# Patient Record
Sex: FEMALE | Race: WHITE | NOT HISPANIC OR LATINO
[De-identification: names, ages, dates, MRNs, and addresses within clinical notes are randomized per-mention and may not be internally consistent; named-entity substitution may affect disease eponyms.]

---

## 2017-01-30 PROBLEM — Z00.00 ENCOUNTER FOR PREVENTIVE HEALTH EXAMINATION: Status: ACTIVE | Noted: 2017-01-30

## 2017-01-31 ENCOUNTER — NON-APPOINTMENT (OUTPATIENT)
Age: 47
End: 2017-01-31

## 2017-01-31 ENCOUNTER — APPOINTMENT (OUTPATIENT)
Dept: CARDIOLOGY | Facility: CLINIC | Age: 47
End: 2017-01-31

## 2017-01-31 VITALS — SYSTOLIC BLOOD PRESSURE: 130 MMHG | DIASTOLIC BLOOD PRESSURE: 58 MMHG | HEART RATE: 58 BPM

## 2017-01-31 VITALS — DIASTOLIC BLOOD PRESSURE: 70 MMHG | SYSTOLIC BLOOD PRESSURE: 126 MMHG | HEART RATE: 60 BPM

## 2017-01-31 VITALS
TEMPERATURE: 98.2 F | HEIGHT: 69 IN | WEIGHT: 190 LBS | OXYGEN SATURATION: 98 % | DIASTOLIC BLOOD PRESSURE: 84 MMHG | HEART RATE: 58 BPM | SYSTOLIC BLOOD PRESSURE: 132 MMHG | BODY MASS INDEX: 28.14 KG/M2

## 2017-01-31 DIAGNOSIS — I35.1 NONRHEUMATIC AORTIC (VALVE) INSUFFICIENCY: ICD-10-CM

## 2017-01-31 DIAGNOSIS — R00.1 BRADYCARDIA, UNSPECIFIED: ICD-10-CM

## 2017-02-02 LAB
ALBUMIN SERPL ELPH-MCNC: 4.8 G/DL
ALP BLD-CCNC: 40 U/L
ALT SERPL-CCNC: 15 U/L
ANION GAP SERPL CALC-SCNC: 14 MMOL/L
AST SERPL-CCNC: 14 U/L
BASOPHILS # BLD AUTO: 0.01 K/UL
BASOPHILS NFR BLD AUTO: 0.2 %
BILIRUB SERPL-MCNC: 0.4 MG/DL
BUN SERPL-MCNC: 17 MG/DL
CALCIUM SERPL-MCNC: 9.8 MG/DL
CHLORIDE SERPL-SCNC: 101 MMOL/L
CHOLEST SERPL-MCNC: 250 MG/DL
CHOLEST/HDLC SERPL: 3.4 RATIO
CO2 SERPL-SCNC: 24 MMOL/L
CREAT SERPL-MCNC: 0.95 MG/DL
EOSINOPHIL # BLD AUTO: 0.1 K/UL
EOSINOPHIL NFR BLD AUTO: 1.9 %
GLUCOSE SERPL-MCNC: 83 MG/DL
HCT VFR BLD CALC: 40.6 %
HDLC SERPL-MCNC: 74 MG/DL
HGB BLD-MCNC: 13.5 G/DL
IMM GRANULOCYTES NFR BLD AUTO: 0.2 %
LDLC SERPL CALC-MCNC: 139 MG/DL
LYMPHOCYTES # BLD AUTO: 1.89 K/UL
LYMPHOCYTES NFR BLD AUTO: 35 %
MAN DIFF?: NORMAL
MCHC RBC-ENTMCNC: 33.3 GM/DL
MCHC RBC-ENTMCNC: 33.6 PG
MCV RBC AUTO: 101 FL
MONOCYTES # BLD AUTO: 0.57 K/UL
MONOCYTES NFR BLD AUTO: 10.6 %
NEUTROPHILS # BLD AUTO: 2.82 K/UL
NEUTROPHILS NFR BLD AUTO: 52.1 %
PLATELET # BLD AUTO: 212 K/UL
POTASSIUM SERPL-SCNC: 4.4 MMOL/L
PROT SERPL-MCNC: 7.6 G/DL
RBC # BLD: 4.02 M/UL
RBC # FLD: 12.9 %
SODIUM SERPL-SCNC: 139 MMOL/L
TRIGL SERPL-MCNC: 186 MG/DL
TSH SERPL-ACNC: 3.04 UIU/ML
WBC # FLD AUTO: 5.4 K/UL

## 2017-02-17 ENCOUNTER — APPOINTMENT (OUTPATIENT)
Dept: CARDIOLOGY | Facility: CLINIC | Age: 47
End: 2017-02-17

## 2017-02-17 ENCOUNTER — MED ADMIN CHARGE (OUTPATIENT)
Age: 47
End: 2017-02-17

## 2017-02-17 DIAGNOSIS — R06.02 SHORTNESS OF BREATH: ICD-10-CM

## 2017-08-02 ENCOUNTER — RESULT REVIEW (OUTPATIENT)
Age: 47
End: 2017-08-02

## 2018-12-26 ENCOUNTER — OUTPATIENT (OUTPATIENT)
Dept: OUTPATIENT SERVICES | Facility: HOSPITAL | Age: 48
LOS: 1 days | End: 2018-12-26
Payer: SELF-PAY

## 2018-12-26 VITALS
DIASTOLIC BLOOD PRESSURE: 70 MMHG | TEMPERATURE: 99 F | OXYGEN SATURATION: 97 % | HEART RATE: 53 BPM | WEIGHT: 190.04 LBS | SYSTOLIC BLOOD PRESSURE: 118 MMHG | HEIGHT: 68.5 IN

## 2018-12-26 VITALS — WEIGHT: 190.04 LBS | HEIGHT: 68.5 IN

## 2018-12-26 DIAGNOSIS — Z98.890 OTHER SPECIFIED POSTPROCEDURAL STATES: Chronic | ICD-10-CM

## 2018-12-26 DIAGNOSIS — Z41.1 ENCOUNTER FOR COSMETIC SURGERY: ICD-10-CM

## 2018-12-26 DIAGNOSIS — E65 LOCALIZED ADIPOSITY: ICD-10-CM

## 2018-12-26 DIAGNOSIS — Z01.818 ENCOUNTER FOR OTHER PREPROCEDURAL EXAMINATION: ICD-10-CM

## 2018-12-26 LAB
ALBUMIN SERPL ELPH-MCNC: 3.9 G/DL — SIGNIFICANT CHANGE UP (ref 3.3–5)
ALP SERPL-CCNC: 40 U/L — SIGNIFICANT CHANGE UP (ref 40–120)
ALT FLD-CCNC: 34 U/L DA — SIGNIFICANT CHANGE UP (ref 10–45)
ANION GAP SERPL CALC-SCNC: 7 MMOL/L — SIGNIFICANT CHANGE UP (ref 5–17)
AST SERPL-CCNC: 32 U/L — SIGNIFICANT CHANGE UP (ref 10–40)
BASOPHILS # BLD AUTO: 0.1 K/UL — SIGNIFICANT CHANGE UP (ref 0–0.2)
BASOPHILS NFR BLD AUTO: 1.2 % — SIGNIFICANT CHANGE UP (ref 0–2)
BILIRUB SERPL-MCNC: 0.2 MG/DL — SIGNIFICANT CHANGE UP (ref 0.2–1.2)
BUN SERPL-MCNC: 23 MG/DL — SIGNIFICANT CHANGE UP (ref 7–23)
CALCIUM SERPL-MCNC: 9.4 MG/DL — SIGNIFICANT CHANGE UP (ref 8.4–10.5)
CHLORIDE SERPL-SCNC: 104 MMOL/L — SIGNIFICANT CHANGE UP (ref 96–108)
CO2 SERPL-SCNC: 30 MMOL/L — SIGNIFICANT CHANGE UP (ref 22–31)
CREAT SERPL-MCNC: 0.95 MG/DL — SIGNIFICANT CHANGE UP (ref 0.5–1.3)
EOSINOPHIL # BLD AUTO: 0 K/UL — SIGNIFICANT CHANGE UP (ref 0–0.5)
EOSINOPHIL NFR BLD AUTO: 0.4 % — SIGNIFICANT CHANGE UP (ref 0–6)
GLUCOSE SERPL-MCNC: 87 MG/DL — SIGNIFICANT CHANGE UP (ref 70–99)
HCG SERPL QL: NEGATIVE — SIGNIFICANT CHANGE UP
HCT VFR BLD CALC: 37.2 % — SIGNIFICANT CHANGE UP (ref 34.5–45)
HGB BLD-MCNC: 12.8 G/DL — SIGNIFICANT CHANGE UP (ref 11.5–15.5)
LYMPHOCYTES # BLD AUTO: 2.8 K/UL — SIGNIFICANT CHANGE UP (ref 1–3.3)
LYMPHOCYTES # BLD AUTO: 42.5 % — SIGNIFICANT CHANGE UP (ref 13–44)
MCHC RBC-ENTMCNC: 34.2 PG — HIGH (ref 27–34)
MCHC RBC-ENTMCNC: 34.5 GM/DL — SIGNIFICANT CHANGE UP (ref 32–36)
MCV RBC AUTO: 99 FL — SIGNIFICANT CHANGE UP (ref 80–100)
MONOCYTES # BLD AUTO: 0.6 K/UL — SIGNIFICANT CHANGE UP (ref 0–0.9)
MONOCYTES NFR BLD AUTO: 9.6 % — SIGNIFICANT CHANGE UP (ref 2–14)
NEUTROPHILS # BLD AUTO: 3 K/UL — SIGNIFICANT CHANGE UP (ref 1.8–7.4)
NEUTROPHILS NFR BLD AUTO: 46.4 % — SIGNIFICANT CHANGE UP (ref 43–77)
PLATELET # BLD AUTO: 196 K/UL — SIGNIFICANT CHANGE UP (ref 150–400)
POTASSIUM SERPL-MCNC: 4.3 MMOL/L — SIGNIFICANT CHANGE UP (ref 3.5–5.3)
POTASSIUM SERPL-SCNC: 4.3 MMOL/L — SIGNIFICANT CHANGE UP (ref 3.5–5.3)
PROT SERPL-MCNC: 7.6 G/DL — SIGNIFICANT CHANGE UP (ref 6–8.3)
RBC # BLD: 3.75 M/UL — LOW (ref 3.8–5.2)
RBC # FLD: 11.4 % — SIGNIFICANT CHANGE UP (ref 10.3–14.5)
SODIUM SERPL-SCNC: 141 MMOL/L — SIGNIFICANT CHANGE UP (ref 135–145)
WBC # BLD: 6.6 K/UL — SIGNIFICANT CHANGE UP (ref 3.8–10.5)
WBC # FLD AUTO: 6.6 K/UL — SIGNIFICANT CHANGE UP (ref 3.8–10.5)

## 2018-12-26 PROCEDURE — 80053 COMPREHEN METABOLIC PANEL: CPT

## 2018-12-26 PROCEDURE — 93005 ELECTROCARDIOGRAM TRACING: CPT

## 2018-12-26 PROCEDURE — 85027 COMPLETE CBC AUTOMATED: CPT

## 2018-12-26 PROCEDURE — 93010 ELECTROCARDIOGRAM REPORT: CPT | Mod: NC

## 2018-12-26 PROCEDURE — 84703 CHORIONIC GONADOTROPIN ASSAY: CPT

## 2018-12-26 PROCEDURE — 36415 COLL VENOUS BLD VENIPUNCTURE: CPT

## 2018-12-26 PROCEDURE — G0463: CPT

## 2018-12-26 RX ORDER — SODIUM CHLORIDE 9 MG/ML
1000 INJECTION, SOLUTION INTRAVENOUS
Qty: 0 | Refills: 0 | Status: DISCONTINUED | OUTPATIENT
Start: 2018-12-28 | End: 2018-12-28

## 2018-12-26 NOTE — H&P PST ADULT - HISTORY OF PRESENT ILLNESS
47 y/o female with a PMH of depression on medication presents for presurgical testing and is currently without any complaints today . She is having abdominoplasty with liposuction of the arms and neck on 12/28/2018 .

## 2018-12-26 NOTE — H&P PST ADULT - NSANTHOSAYNRD_GEN_A_CORE
No. KAYLIN screening performed.  STOP BANG Legend: 0-2 = LOW Risk; 3-4 = INTERMEDIATE Risk; 5-8 = HIGH Risk

## 2018-12-26 NOTE — H&P PST ADULT - NSCAFFEINETYPE_GEN_ALL_CORE_SD
normal... Well appearing, well nourished, awake, alert, oriented to person, place, time/situation and in no apparent distress. coffee

## 2018-12-27 ENCOUNTER — APPOINTMENT (OUTPATIENT)
Dept: CARDIOLOGY | Facility: CLINIC | Age: 48
End: 2018-12-27
Payer: COMMERCIAL

## 2018-12-27 VITALS
BODY MASS INDEX: 28.44 KG/M2 | DIASTOLIC BLOOD PRESSURE: 78 MMHG | WEIGHT: 192 LBS | HEIGHT: 69 IN | OXYGEN SATURATION: 100 % | SYSTOLIC BLOOD PRESSURE: 120 MMHG | HEART RATE: 70 BPM

## 2018-12-27 DIAGNOSIS — E78.9 DISORDER OF LIPOPROTEIN METABOLISM, UNSPECIFIED: ICD-10-CM

## 2018-12-27 DIAGNOSIS — I35.0 NONRHEUMATIC AORTIC (VALVE) STENOSIS: ICD-10-CM

## 2018-12-27 DIAGNOSIS — Z01.818 ENCOUNTER FOR OTHER PREPROCEDURAL EXAMINATION: ICD-10-CM

## 2018-12-27 PROCEDURE — 99215 OFFICE O/P EST HI 40 MIN: CPT

## 2018-12-27 NOTE — DISCUSSION/SUMMARY
[Procedure Intermediate Risk] : the procedure risk is intermediate [Patient Low Risk] : the patient is a low surgical risk [Optimized for Surgery] : the patient is optimized for surgery [As per surgery] : as per surgery [Continue] : Continue medications as currently directed [FreeTextEntry1] : Patient is healthy and active, with no new symptoms. Stress test and echo less than 2 years ago, and unchanged exam, and EKG. She should not be considered to be at increased risk for the upcoming surgery and anesthesia

## 2018-12-27 NOTE — REVIEW OF SYSTEMS
[see HPI] : see HPI [Negative] : Heme/Lymph [Recent Weight Gain (___ Lbs)] : no recent weight gain [Feeling Fatigued] : not feeling fatigued [Recent Weight Loss (___ Lbs)] : no recent weight loss [Dyspnea on exertion] : not dyspnea during exertion [Chest  Pressure] : no chest pressure [Chest Pain] : no chest pain [Lower Ext Edema] : no extremity edema [Leg Claudication] : no intermittent leg claudication [Palpitations] : no palpitations [Dizziness] : no dizziness

## 2018-12-27 NOTE — REASON FOR VISIT
[FreeTextEntry1] : 46-year-old woman with a very positive family history for coronary artery disease and new dyspnea on exertion

## 2018-12-27 NOTE — HISTORY OF PRESENT ILLNESS
[Preoperative Visit] : for a medical evaluation prior to surgery [Scheduled Procedure ___] : a [unfilled] [Date of Surgery ___] : on [unfilled] [Surgeon Name ___] : surgeon: [unfilled] [Fever] : no fever [Chills] : no chills [Fatigue] : no fatigue [Chest Pain] : no chest pain [Cough] : no cough [Dyspnea] : no dyspnea [Dysuria] : no dysuria [Urinary Frequency] : no urinary frequency [Nausea] : no nausea [Vomiting] : no vomiting [Diarrhea] : no diarrhea [Abdominal Pain] : no abdominal pain [Easy Bruising] : no easy bruising [Lower Extremity Swelling] : no lower extremity swelling [Poor Exercise Tolerance] : no poor exercise tolerance [Diabetes] : no diabetes [Cardiovascular Disease] : no cardiovascular disease [Pulmonary Disease] : no pulmonary disease [Anti-Platelet Agents] : no anti-platelet agents [Nicotine Dependence] : no nicotine dependence [Alcohol Use] : no  alcohol use [Renal Disease] : no renal disease [GI Disease] : no gastrointestinal disease [Sleep Apnea] : no sleep apnea [Thromboembolic Problems] : no thromboembolic problems [Frequent use of NSAIDs] : no use of NSAIDs [Transfusion Reaction] : no transfusion reaction [Impaired Immunity] : no impaired immunity [Steroid Use in Last 6 Months] : no steroid use in the last six months [Frequent Aspirin Use] : no frequent aspirin use [Prior Anesthesia] : Prior anesthesia [Prev Anesthesia Reaction] : no previous anesthesia reaction [Electrocardiogram] : ~T an ECG ~C was performed [Echocardiogram] : ~T an echocardiogram ~C was performed [Cardiovascular Stress Test] : a cardiac stress test ~T ~C was performed [Metabolic Capacity ___Mets%] : The patient has a metabolic capacity of [unfilled] Mets%  [Good] : Good [Anesthesia Reaction] : no anesthesia reaction [Sudden Death] : sudden death [Clotting Disorder] : no clotting disorder [Bleeding Disorder] : no bleeding disorder [FreeTextEntry1] : 2017. 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 The patient has been active, working at 3-4 times a week, etc. 2 weeks ago she had shingles that was mostly around her left shoulder and back. She was recently on vacation in [place] and when walking uphill she felt short of breath and a "overwhelming sensation that she couldn't breathe". This would take a few minutes to resolve and this recurred a few times. Since then she has also noted that when she does her usual workouts, she is "more wiped than usual.". She tried to listen to herself with a stethoscope and thought she had a significant murmur and came for an appointment. She claims she has always had some issues going up stairs and with aerobic exercise, but this seems to be more than usual. She is premenopausal on birth control. After a couple of miscarriages she was diagnosed with a possible antiphospholipid antibody and had Lovenox for subsequent pregnancies without problems. She has a history of varicose veins and has been having some progressive edema over the years. Her last checkup was when she had a preop clearance for bunion surgery 2 years ago, and no murmur was mentioned. Unclear if or when she has had her cholesterol checked. She denies tick bite or exposure to such; her EKG did have sinus bradycardia with sinus arrhythmia in the low 50s. She has never had syncope, near syncope, or palpitations. No history of asthma. Never told of a heart murmur as a kid.\par 2017. Patient returned for stress test and echocardiogram. On the stress test she was able to exercise for 8 minutes to a heart rate of 155 and blood pressure 150/92. No symptoms. No ischemic changes. No arrhythmias. Her echocardiogram had thickened mitral leaflets and minimal MR. Mildly sclerotic, trileaflet aortic valve. A peak gradient of 16 and a mean of 11.. No AI. Normal LV size and function. Normal RV size and function. LVEF 60%. RVSP 26.\par 2018. Patient is here for preop evaluation for abdominoplasty with liposuction of the arms and neck. She has been totally stable over the past 2 years. She is on a mild dose of citalopram 20 mg for depression. No hospitalizations, emergency room visits, trauma, etc. No cardiac symptoms. Fairly active. She does have a fair amount of stress including her daughter, who has a rare movement disorder syndrome that is in the process of being evaluated

## 2018-12-27 NOTE — PHYSICAL EXAM
[General Appearance - Well Developed] : well developed [Normal Appearance] : normal appearance [Well Groomed] : well groomed [General Appearance - Well Nourished] : well nourished [No Deformities] : no deformities [General Appearance - In No Acute Distress] : no acute distress [Normal Conjunctiva] : the conjunctiva exhibited no abnormalities [Eyelids - No Xanthelasma] : the eyelids demonstrated no xanthelasmas [Normal Oral Mucosa] : normal oral mucosa [No Oral Pallor] : no oral pallor [No Oral Cyanosis] : no oral cyanosis [Normal Jugular Venous A Waves Present] : normal jugular venous A waves present [Normal Jugular Venous V Waves Present] : normal jugular venous V waves present [No Jugular Venous Lynch A Waves] : no jugular venous lynch A waves [Respiration, Rhythm And Depth] : normal respiratory rhythm and effort [Exaggerated Use Of Accessory Muscles For Inspiration] : no accessory muscle use [Auscultation Breath Sounds / Voice Sounds] : lungs were clear to auscultation bilaterally [Heart Rate And Rhythm] : heart rate and rhythm were normal [Heart Sounds] : normal S1 and S2 [Abdomen Soft] : soft [Abdomen Tenderness] : non-tender [Abdomen Mass (___ Cm)] : no abdominal mass palpated [Abnormal Walk] : normal gait [Gait - Sufficient For Exercise Testing] : the gait was sufficient for exercise testing [Nail Clubbing] : no clubbing of the fingernails [Cyanosis, Localized] : no localized cyanosis [Petechial Hemorrhages (___cm)] : no petechial hemorrhages [Skin Color & Pigmentation] : normal skin color and pigmentation [] : no rash [No Venous Stasis] : no venous stasis [Skin Lesions] : no skin lesions [No Skin Ulcers] : no skin ulcer [No Xanthoma] : no  xanthoma was observed [Oriented To Time, Place, And Person] : oriented to person, place, and time [Affect] : the affect was normal [Mood] : the mood was normal [FreeTextEntry1] : No significant edema. Pulses 2+ bilaterally symmetrical including pedal. [No Anxiety] : not feeling anxious

## 2018-12-28 ENCOUNTER — RESULT REVIEW (OUTPATIENT)
Age: 48
End: 2018-12-28

## 2018-12-28 ENCOUNTER — OUTPATIENT (OUTPATIENT)
Dept: OUTPATIENT SERVICES | Facility: HOSPITAL | Age: 48
LOS: 1 days | End: 2018-12-28
Payer: SELF-PAY

## 2018-12-28 VITALS
SYSTOLIC BLOOD PRESSURE: 111 MMHG | WEIGHT: 190.04 LBS | RESPIRATION RATE: 17 BRPM | HEART RATE: 60 BPM | TEMPERATURE: 98 F | HEIGHT: 68.5 IN | DIASTOLIC BLOOD PRESSURE: 71 MMHG | OXYGEN SATURATION: 99 %

## 2018-12-28 VITALS
HEART RATE: 59 BPM | DIASTOLIC BLOOD PRESSURE: 66 MMHG | RESPIRATION RATE: 16 BRPM | SYSTOLIC BLOOD PRESSURE: 101 MMHG | OXYGEN SATURATION: 99 % | TEMPERATURE: 98 F

## 2018-12-28 DIAGNOSIS — Z98.890 OTHER SPECIFIED POSTPROCEDURAL STATES: Chronic | ICD-10-CM

## 2018-12-28 DIAGNOSIS — Z41.1 ENCOUNTER FOR COSMETIC SURGERY: ICD-10-CM

## 2018-12-28 DIAGNOSIS — E65 LOCALIZED ADIPOSITY: ICD-10-CM

## 2018-12-28 PROCEDURE — 15847 EXC SKIN ABD ADD-ON: CPT

## 2018-12-28 PROCEDURE — 15830 EXC EXCESSIVE SKIN ABDOMEN: CPT

## 2018-12-28 PROCEDURE — 15876 SUCTION LIPECTOMY HEAD&NECK: CPT

## 2018-12-28 PROCEDURE — 15877 SUCTION LIPECTOMY TRUNK: CPT

## 2018-12-28 PROCEDURE — 15878 SUCTION LIPECTOMY UPR EXTREM: CPT | Mod: 50

## 2018-12-28 RX ORDER — ONDANSETRON 8 MG/1
4 TABLET, FILM COATED ORAL EVERY 6 HOURS
Qty: 0 | Refills: 0 | Status: DISCONTINUED | OUTPATIENT
Start: 2018-12-28 | End: 2019-01-12

## 2018-12-28 RX ORDER — ONDANSETRON 8 MG/1
4 TABLET, FILM COATED ORAL ONCE
Qty: 0 | Refills: 0 | Status: DISCONTINUED | OUTPATIENT
Start: 2018-12-28 | End: 2018-12-28

## 2018-12-28 RX ORDER — SODIUM CHLORIDE 9 MG/ML
1000 INJECTION, SOLUTION INTRAVENOUS
Qty: 0 | Refills: 0 | Status: DISCONTINUED | OUTPATIENT
Start: 2018-12-28 | End: 2018-12-28

## 2018-12-28 RX ORDER — HYDROMORPHONE HYDROCHLORIDE 2 MG/ML
0.5 INJECTION INTRAMUSCULAR; INTRAVENOUS; SUBCUTANEOUS ONCE
Qty: 0 | Refills: 0 | Status: DISCONTINUED | OUTPATIENT
Start: 2018-12-28 | End: 2018-12-28

## 2018-12-28 RX ORDER — ACETAMINOPHEN WITH CODEINE 300MG-30MG
1 TABLET ORAL EVERY 4 HOURS
Qty: 0 | Refills: 0 | Status: DISCONTINUED | OUTPATIENT
Start: 2018-12-28 | End: 2018-12-28

## 2018-12-28 NOTE — BRIEF OPERATIVE NOTE - PROCEDURE
<<-----Click on this checkbox to enter Procedure Liposuction of both thighs  12/28/2018    Active  [email or web address]  Liposuction of neck  12/28/2018    Active  [email or web address]  Abdominal liposuction  12/28/2018    Active  [email or web address] Liposuction of both upper arms  12/28/2018    Active  [email or web address]

## 2018-12-28 NOTE — BRIEF OPERATIVE NOTE - POST-OP DX
Localized adiposity of abdomen  12/28/2018    Active  Sally Gaston  Localized adiposity of neck  12/28/2018    Active  Sally Gaston  Localized adiposity of thigh  12/28/2018  bilateral  Active  Sally Gaston Localized adiposity  12/28/2018  bialteral upper arms  Active  Sally Gaston  Localized adiposity of abdomen  12/28/2018    Active  Sally Gaston  Localized adiposity of neck  12/28/2018    Active  Sally Gaston  Localized adiposity of thigh  12/28/2018  bilateral  Active  Sally Gaston

## 2018-12-28 NOTE — ASU DISCHARGE PLAN (ADULT/PEDIATRIC). - SPECIAL INSTRUCTIONS
Empty DAX drains as needed; return to self-suction.  Keep head of bed elevated with feet/knees elevated ("jackknife" position) to alleviate tension on abdomen.  Wear abdominal binder loosely for comfort.  Pain medication, anti-nausea medication and antibiotics as per Dr. Miramontes's instructions (given in office)

## 2018-12-28 NOTE — BRIEF OPERATIVE NOTE - PRE-OP DX
Localized adiposity of abdomen  12/28/2018    Sally Cadet  Localized adiposity of neck  12/28/2018    Sally Cadet  Localized adiposity of thigh  12/28/2018    Active  Sally Gaston Adiposity, localized  12/28/2018  bilateral upper arms  Active  Sally Gaston  Localized adiposity of abdomen  12/28/2018    Active  Sally Gaston  Localized adiposity of neck  12/28/2018    Active  Sally Gaston  Localized adiposity of thigh  12/28/2018    Active  Sally Gaston

## 2018-12-28 NOTE — ASU DISCHARGE PLAN (ADULT/PEDIATRIC). - FOLLOWUP APPOINTMENT CLINIC/PHYSICIAN
Dr. Miramontes will call you over the weekend; follow-up in his office in 1 week; call for appointment.

## 2018-12-28 NOTE — ASU DISCHARGE PLAN (ADULT/PEDIATRIC). - NOTIFY
Bleeding that does not stop/Unable to Urinate/Inability to Tolerate Liquids or Foods/Persistent Nausea and Vomiting/Fever greater than 101/Pain not relieved by Medications

## 2019-06-28 PROBLEM — F32.9 MAJOR DEPRESSIVE DISORDER, SINGLE EPISODE, UNSPECIFIED: Chronic | Status: ACTIVE | Noted: 2018-12-26

## 2019-07-01 ENCOUNTER — OUTPATIENT (OUTPATIENT)
Dept: OUTPATIENT SERVICES | Facility: HOSPITAL | Age: 49
LOS: 1 days | End: 2019-07-01
Payer: SELF-PAY

## 2019-07-01 VITALS
RESPIRATION RATE: 15 BRPM | WEIGHT: 197.53 LBS | HEIGHT: 68.5 IN | HEART RATE: 63 BPM | SYSTOLIC BLOOD PRESSURE: 113 MMHG | TEMPERATURE: 99 F | DIASTOLIC BLOOD PRESSURE: 79 MMHG

## 2019-07-01 DIAGNOSIS — Z01.818 ENCOUNTER FOR OTHER PREPROCEDURAL EXAMINATION: ICD-10-CM

## 2019-07-01 DIAGNOSIS — Z98.890 OTHER SPECIFIED POSTPROCEDURAL STATES: Chronic | ICD-10-CM

## 2019-07-01 DIAGNOSIS — N62 HYPERTROPHY OF BREAST: ICD-10-CM

## 2019-07-01 DIAGNOSIS — F32.9 MAJOR DEPRESSIVE DISORDER, SINGLE EPISODE, UNSPECIFIED: ICD-10-CM

## 2019-07-01 LAB
ANION GAP SERPL CALC-SCNC: 8 MMOL/L — SIGNIFICANT CHANGE UP (ref 5–17)
BUN SERPL-MCNC: 17 MG/DL — SIGNIFICANT CHANGE UP (ref 7–23)
CALCIUM SERPL-MCNC: 9.4 MG/DL — SIGNIFICANT CHANGE UP (ref 8.4–10.5)
CHLORIDE SERPL-SCNC: 103 MMOL/L — SIGNIFICANT CHANGE UP (ref 96–108)
CO2 SERPL-SCNC: 28 MMOL/L — SIGNIFICANT CHANGE UP (ref 22–31)
CREAT SERPL-MCNC: 0.78 MG/DL — SIGNIFICANT CHANGE UP (ref 0.5–1.3)
GLUCOSE SERPL-MCNC: 94 MG/DL — SIGNIFICANT CHANGE UP (ref 70–99)
HCG SERPL-ACNC: <2 MIU/ML — SIGNIFICANT CHANGE UP
HCT VFR BLD CALC: 36.3 % — SIGNIFICANT CHANGE UP (ref 34.5–45)
HGB BLD-MCNC: 12.3 G/DL — SIGNIFICANT CHANGE UP (ref 11.5–15.5)
MCHC RBC-ENTMCNC: 33.8 PG — SIGNIFICANT CHANGE UP (ref 27–34)
MCHC RBC-ENTMCNC: 33.9 GM/DL — SIGNIFICANT CHANGE UP (ref 32–36)
MCV RBC AUTO: 99.7 FL — SIGNIFICANT CHANGE UP (ref 80–100)
NRBC # BLD: 0 /100 WBCS — SIGNIFICANT CHANGE UP (ref 0–0)
PLATELET # BLD AUTO: 185 K/UL — SIGNIFICANT CHANGE UP (ref 150–400)
POTASSIUM SERPL-MCNC: 4.3 MMOL/L — SIGNIFICANT CHANGE UP (ref 3.5–5.3)
POTASSIUM SERPL-SCNC: 4.3 MMOL/L — SIGNIFICANT CHANGE UP (ref 3.5–5.3)
RBC # BLD: 3.64 M/UL — LOW (ref 3.8–5.2)
RBC # FLD: 11.9 % — SIGNIFICANT CHANGE UP (ref 10.3–14.5)
SODIUM SERPL-SCNC: 139 MMOL/L — SIGNIFICANT CHANGE UP (ref 135–145)
WBC # BLD: 5.81 K/UL — SIGNIFICANT CHANGE UP (ref 3.8–10.5)
WBC # FLD AUTO: 5.81 K/UL — SIGNIFICANT CHANGE UP (ref 3.8–10.5)

## 2019-07-01 PROCEDURE — 84702 CHORIONIC GONADOTROPIN TEST: CPT

## 2019-07-01 PROCEDURE — 36415 COLL VENOUS BLD VENIPUNCTURE: CPT

## 2019-07-01 PROCEDURE — 93010 ELECTROCARDIOGRAM REPORT: CPT | Mod: NC

## 2019-07-01 PROCEDURE — 93005 ELECTROCARDIOGRAM TRACING: CPT

## 2019-07-01 PROCEDURE — G0463: CPT

## 2019-07-01 PROCEDURE — 85027 COMPLETE CBC AUTOMATED: CPT

## 2019-07-01 PROCEDURE — 80048 BASIC METABOLIC PNL TOTAL CA: CPT

## 2019-07-01 NOTE — H&P PST ADULT - NSICDXPASTSURGICALHX_GEN_ALL_CORE_FT
PAST SURGICAL HISTORY:  H/O abdominoplasty     History of dilatation and curettage x 3    S/P bunionectomy

## 2019-07-01 NOTE — H&P PST ADULT - NSICDXPROBLEM_GEN_ALL_CORE_FT
PROBLEM DIAGNOSES  Problem: Hypertrophy of breast  Assessment and Plan: Bilateral Breast Reduction with Liposuction scheduled for 7/8/2019  Pre-op instructions given. Pt verbalized understanding  Pepcid given for GI prophylaxis  Chlorhexidine wash instructions given  Pending: Medical clearance - requested by surgeon     Problem: Depression  Assessment and Plan: Pt instructed to take meds as prescribed

## 2019-07-01 NOTE — H&P PST ADULT - NEGATIVE PSYCHIATRIC SYMPTOMS
no anxiety/no memory loss/no agitation/no suicidal ideation/no paranoia/no mood swings/no insomnia/no visual hallucinations

## 2019-07-01 NOTE — H&P PST ADULT - NSICDXFAMILYHX_GEN_ALL_CORE_FT
FAMILY HISTORY:  Father  Still living? Yes, Estimated age: Age Unknown  Family history of hypertension, Age at diagnosis: Age Unknown    Mother  Still living? Yes, Estimated age: Age Unknown  Family history of diabetes mellitus, Age at diagnosis: Age Unknown

## 2019-07-01 NOTE — H&P PST ADULT - HISTORY OF PRESENT ILLNESS
47yo female with medical h/o Depression, c/o large breast and presents today for presurgical testing for Bilateral Breast Reduction with Liposuction scheduled for 7/8/2019

## 2019-07-01 NOTE — H&P PST ADULT - MUSCULOSKELETAL
negative detailed exam no joint warmth/ROM intact/no joint swelling/no joint erythema/no calf tenderness/normal strength

## 2019-07-07 ENCOUNTER — TRANSCRIPTION ENCOUNTER (OUTPATIENT)
Age: 49
End: 2019-07-07

## 2019-07-08 ENCOUNTER — RESULT REVIEW (OUTPATIENT)
Age: 49
End: 2019-07-08

## 2019-07-08 ENCOUNTER — OUTPATIENT (OUTPATIENT)
Dept: OUTPATIENT SERVICES | Facility: HOSPITAL | Age: 49
LOS: 1 days | End: 2019-07-08
Payer: SELF-PAY

## 2019-07-08 VITALS
DIASTOLIC BLOOD PRESSURE: 58 MMHG | HEART RATE: 58 BPM | SYSTOLIC BLOOD PRESSURE: 100 MMHG | OXYGEN SATURATION: 100 % | RESPIRATION RATE: 16 BRPM | TEMPERATURE: 98 F

## 2019-07-08 VITALS
WEIGHT: 197.53 LBS | DIASTOLIC BLOOD PRESSURE: 79 MMHG | RESPIRATION RATE: 18 BRPM | TEMPERATURE: 99 F | OXYGEN SATURATION: 99 % | HEIGHT: 68.5 IN | SYSTOLIC BLOOD PRESSURE: 121 MMHG | HEART RATE: 68 BPM

## 2019-07-08 DIAGNOSIS — N62 HYPERTROPHY OF BREAST: ICD-10-CM

## 2019-07-08 DIAGNOSIS — Z98.890 OTHER SPECIFIED POSTPROCEDURAL STATES: Chronic | ICD-10-CM

## 2019-07-08 PROCEDURE — 88305 TISSUE EXAM BY PATHOLOGIST: CPT | Mod: 26

## 2019-07-08 PROCEDURE — 15879 SUCTION LIPECTOMY LWR EXTREM: CPT

## 2019-07-08 PROCEDURE — 88304 TISSUE EXAM BY PATHOLOGIST: CPT

## 2019-07-08 PROCEDURE — 88304 TISSUE EXAM BY PATHOLOGIST: CPT | Mod: 26

## 2019-07-08 PROCEDURE — 88305 TISSUE EXAM BY PATHOLOGIST: CPT

## 2019-07-08 PROCEDURE — 15877 SUCTION LIPECTOMY TRUNK: CPT

## 2019-07-08 PROCEDURE — 15878 SUCTION LIPECTOMY UPR EXTREM: CPT

## 2019-07-08 PROCEDURE — 19318 BREAST REDUCTION: CPT | Mod: 50

## 2019-07-08 RX ORDER — CITALOPRAM 10 MG/1
1 TABLET, FILM COATED ORAL
Qty: 0 | Refills: 0 | DISCHARGE

## 2019-07-08 RX ORDER — CEPHALEXIN 500 MG
1 CAPSULE ORAL
Qty: 21 | Refills: 0
Start: 2019-07-08 | End: 2019-07-14

## 2019-07-08 RX ORDER — ACETAMINOPHEN WITH CODEINE 300MG-30MG
1 TABLET ORAL
Qty: 20 | Refills: 0
Start: 2019-07-08

## 2019-07-08 RX ORDER — SODIUM CHLORIDE 9 MG/ML
1000 INJECTION, SOLUTION INTRAVENOUS
Refills: 0 | Status: DISCONTINUED | OUTPATIENT
Start: 2019-07-08 | End: 2019-07-08

## 2019-07-08 RX ORDER — OXYCODONE HYDROCHLORIDE 5 MG/1
5 TABLET ORAL ONCE
Refills: 0 | Status: DISCONTINUED | OUTPATIENT
Start: 2019-07-08 | End: 2019-07-08

## 2019-07-08 RX ORDER — HYDROMORPHONE HYDROCHLORIDE 2 MG/ML
0.5 INJECTION INTRAMUSCULAR; INTRAVENOUS; SUBCUTANEOUS ONCE
Refills: 0 | Status: DISCONTINUED | OUTPATIENT
Start: 2019-07-08 | End: 2019-07-08

## 2019-07-08 RX ORDER — ONDANSETRON 8 MG/1
4 TABLET, FILM COATED ORAL EVERY 6 HOURS
Refills: 0 | Status: DISCONTINUED | OUTPATIENT
Start: 2019-07-08 | End: 2019-07-23

## 2019-07-08 RX ORDER — ONDANSETRON 8 MG/1
1 TABLET, FILM COATED ORAL
Qty: 15 | Refills: 0
Start: 2019-07-08

## 2019-07-08 RX ORDER — ONDANSETRON 8 MG/1
4 TABLET, FILM COATED ORAL ONCE
Refills: 0 | Status: COMPLETED | OUTPATIENT
Start: 2019-07-08 | End: 2019-07-08

## 2019-07-08 RX ORDER — ACETAMINOPHEN 500 MG
650 TABLET ORAL EVERY 6 HOURS
Refills: 0 | Status: DISCONTINUED | OUTPATIENT
Start: 2019-07-08 | End: 2019-07-23

## 2019-07-08 RX ORDER — OXYCODONE HYDROCHLORIDE 5 MG/1
10 TABLET ORAL ONCE
Refills: 0 | Status: DISCONTINUED | OUTPATIENT
Start: 2019-07-08 | End: 2019-07-08

## 2019-07-08 RX ADMIN — ONDANSETRON 4 MILLIGRAM(S): 8 TABLET, FILM COATED ORAL at 17:04

## 2019-07-08 RX ADMIN — SODIUM CHLORIDE 50 MILLILITER(S): 9 INJECTION, SOLUTION INTRAVENOUS at 07:55

## 2019-07-08 NOTE — ASU DISCHARGE PLAN (ADULT/PEDIATRIC) - CALL YOUR DOCTOR IF YOU HAVE ANY OF THE FOLLOWING:
Bleeding that does not stop/Fever greater than (need to indicate Fahrenheit or Celsius)/Inability to tolerate liquids or foods/Wound/Surgical Site with redness, or foul smelling discharge or pus/Pain not relieved by Medications/Numbness, tingling, color or temperature change to extremity/Unable to urinate/Swelling that gets worse/Nausea and vomiting that does not stop

## 2019-07-08 NOTE — BRIEF OPERATIVE NOTE - NSICDXBRIEFPREOP_GEN_ALL_CORE_FT
PRE-OP DIAGNOSIS:  Adiposity 08-Jul-2019 15:35:11  Sally Gaston  Breast hypertrophy 08-Jul-2019 15:34:48  Sally Gaston

## 2019-07-08 NOTE — ASU DISCHARGE PLAN (ADULT/PEDIATRIC) - CARE PROVIDER_API CALL
Mk Miramontes)  Plastic Surgery  [address] [place] [id number]  Phone: [phone]  Fax: [phone]  Follow Up Time:

## 2019-07-08 NOTE — ASU DISCHARGE PLAN (ADULT/PEDIATRIC) - PROCEDURE
bilat breast reduction; liposuction of both upper arms, flanks, back and hips; scar revision left lateral abdomen

## 2019-07-08 NOTE — ASU DISCHARGE PLAN (ADULT/PEDIATRIC) - ASU DC SPECIAL INSTRUCTIONSFT
Wear surgical bra at all times until advised otherwise by Dr. Miramontes.  Do NOT remove clear surgical glue/webbing over breast incisions.  Dr. Miramontes will call you tonight; follow-up in his office as per his instructions.

## 2019-07-08 NOTE — BRIEF OPERATIVE NOTE - NSICDXBRIEFPROCEDURE_GEN_ALL_CORE_FT
PROCEDURES:  Liposuction, back 08-Jul-2019 15:34:04 bilateral flanks Sally Gaston  Liposuction of both upper extremities 08-Jul-2019 15:33:13  Sally Gaston  Liposuction, hip 08-Jul-2019 15:31:31  Sally Gaston  Reduction of both breasts 08-Jul-2019 15:31:16  Sally Gaston

## 2019-07-08 NOTE — BRIEF OPERATIVE NOTE - NSICDXBRIEFPOSTOP_GEN_ALL_CORE_FT
POST-OP DIAGNOSIS:  Adiposity 08-Jul-2019 15:35:46  Sally Gaston  Breast hypertrophy 08-Jul-2019 15:35:33  Sally Gaston

## 2019-07-10 LAB — SURGICAL PATHOLOGY STUDY: SIGNIFICANT CHANGE UP

## 2020-03-11 NOTE — ASU PREOP CHECKLIST - ORDERS/MEDICATION ADMINISTRATION RECORD ON CHART
----- Message from Fabiola Miles sent at 3/10/2020 11:55 AM CDT -----  Regarding: LAB ORDERS  Patient has lab appointment Thursday 3/12. Please add orders. Thank you.     
Order done/signed and sent to lab/x-ray.  
done

## 2020-06-19 ENCOUNTER — RESULT REVIEW (OUTPATIENT)
Age: 50
End: 2020-06-19

## 2020-06-29 ENCOUNTER — RESULT REVIEW (OUTPATIENT)
Age: 50
End: 2020-06-29

## 2021-03-23 NOTE — ASU DISCHARGE PLAN (ADULT/PEDIATRIC) - FOLLOW UP APPOINTMENTS
History and Physical        Patient: Janice Dempsey               Sex: female          DOA: (Not on file)         YOB: 1980      Age:  41 y.o.        LOS:  LOS: 0 days        HPI:     Madhu Workman is in for follow up of her right shoulder bursitis/AC DJD/near full-thickness supraspinatus and subscapularis rotator cuff tear with biceps subluxation from a recent MRI, and is status post a right Bankart repair from 1992. The patient's right shoulder MRI does show that she has near full-thickness subscapularis tearing with high-grade 11 mm supraspinatus tearing and subluxation of her long head of the biceps. You can see the old Bankart repair as well. AP, lateral, and scapular views of the right shoulder were obtained and interpreted in the office and show a type 2 acromion and three Mitek metal anchors in the anterior glenoid. Otherwise, x-rays reveal no periosteal reaction, no medullary lesions, no osteopenia, well-aligned joint spaces, no chondrolysis, no fractures, and no abnormal calcifications. Past Medical History:   Diagnosis Date    Velásquez's esophagus     Duke-Danlos syndrome     GERD (gastroesophageal reflux disease)     Barretts Esophagus    Hiatal hernia     Ill-defined condition     IBSC    Nausea & vomiting     Psychiatric disorder     depression       Past Surgical History:   Procedure Laterality Date    HX ADENOIDECTOMY      HX APPENDECTOMY      HX BREAST AUGMENTATION  2008    HX ORTHOPAEDIC  1994    right shoulder-labrum repair    HX ORTHOPAEDIC Right 2021    CTR     HX TONSILLECTOMY      NEUROLOGICAL PROCEDURE UNLISTED  2018    spinal fusion       No family history on file.     Social History     Socioeconomic History    Marital status:      Spouse name: Not on file    Number of children: Not on file    Years of education: Not on file    Highest education level: Not on file   Tobacco Use    Smoking status: Never Smoker    Smokeless tobacco: Never Used   Substance and Sexual Activity    Alcohol use: No    Drug use: Yes     Comment: CBD with melatonin-stopped 2 weeks prior to DOS.  Sexual activity: Yes       Prior to Admission medications    Medication Sig Start Date End Date Taking? Authorizing Provider   ondansetron (ZOFRAN ODT) 8 mg disintegrating tablet Take 1 Tab by mouth every eight (8) hours as needed for Nausea for up to 12 doses. 3/1/21   Joy Ortega MD   cyclobenzaprine (FLEXERIL) 10 mg tablet Take 1 Tab by mouth three (3) times daily as needed for Muscle Spasm(s). 3/1/21   Joy Ortega MD   RABEprazole (Aciphex) 20 mg TbEC Take 20 mg by mouth daily. Provider, Historical   testosterone (ANDROGEL) 1 % (25 mg/2.5gram) glpk 2.5 g by TransDERmal route every other day. Provider, Historical   LORazepam (Ativan) 1 mg tablet Take 1 mg by mouth every evening. Provider, Historical   meloxicam (MOBIC) 15 mg tablet Take 15 mg by mouth daily. Provider, Historical   magnesium 250 mg tab Take  by mouth. Provider, Historical   melatonin 1 mg tablet Take  by mouth. Provider, Historical   zinc sulfate (ZINC-220 PO) Take  by mouth. Provider, Historical   ascorbic acid, vitamin C, (Vitamin C) 250 mg tablet Take  by mouth. Provider, Historical   buPROPion SR (WELLBUTRIN SR) 200 mg SR tablet Take 300 mg by mouth two (2) times a day. Provider, Historical   LEVOMEFOLATE CALCIUM (DEPLIN PO) Take 15 mg by mouth nightly. Provider, Historical   cholecalciferol (VITAMIN D3) 1,000 unit cap Take 5,000 Units by mouth daily. Provider, Historical   lisdexamfetamine (VYVANSE) 30 mg capsule Take 30 mg by mouth every morningIndications: Attention-Deficit Hyperactivity Disorder.     Provider, Historical       Allergies   Allergen Reactions    Amoxicillin Hives    Carrot Other (comments)     Allergy testing    Gluten Other (comments)    Pineapple Other (comments)     Allergy testing    Pork/Porcine Containing Products Other (comments)     Allergy testing    Scallops Other (comments)     Per allergy testing    Sweet Potato Other (comments)     Allergy testing       Review of Systems  GENERAL:  Patient has no signs of fever, chills or weight change. HEAD/ENTM:  Patient has no signs of headaches, dizziness, hearing loss, ringing in ears, sore throat/hoarseness, recent cold, double vision, blurred vision, itchy eyes, eye redness or eye discharge. CARDIOVASCULAR:  Patient has no signs of chest pain, palpitations, rheumatic fever or heart murmur. RESPIRATORY:  Patient has no signs of chronic cough, wheezing, difficulty breathing, pain on breathing or shortness of breath. GASTROINTESTINAL:  Patient has no signs of nausea/vomiting, difficulty swallowing, gas/bloating, indigestion, abdominal pain, diarrhea, bloody stools or hemorrhoids. GENITOURINARY:  Patient has no signs of blood in urine, painful urinating, burning sensation, bladder/kidney infection, frequent urinating or incontinence. MUSCULOSKELETAL: Patient presents with joint pain. Patient has no signs of fracture/dislocation, sprain/strain, tendonitis, joint stiffness, rheumatoid disease, gout or swelling of feet. INTEGUMENTARY:  Patient has no signs of rash/itching, psoriasis, Raynaud's phenomenon or varicose veins. EMOTIONAL:  Patient has no signs of anxiety, depression, bipolar disorder, memory loss or change in mood. Physical Exam:      There were no vitals taken for this visit. Physical Exam:  Physical exam shows a healthy-appearing 42-year-old white female. The patient's right shoulder has mild pain at the [facility] joint, and she has a positive abduction impingement sign at 90 degrees that is worse with internal rotation. Her rotator cuff strength is 4+/5 in external rotation strength testing with the elbow at the side. She has pain as she resists in strength testing. The right shoulder has no pain at the biceps groove.   The skin has no swelling, ecchymosis, or wounds. There is full range of motion in all four directions similar to the opposite shoulder. The patient has a negative forward elevation impingement sign. In the supine position, the patient has no abduction or external rotation apprehension sign and has a negative relocation maneuver. The patient has a negative sulcus sign. 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 Yessi Vazquez told her since she has failed about two months' worth of his prescribed formal therapy that we talked about in the past and she has had worsening of the MRI of her shoulder, and her shoulder has been keeping her awake at night consistently for the last three weeks, we should proceed in the surgical direction. He talked about a right shoulder arthroscopy, decompression, distal clavicle excision, and arthroscopic rotator cuff repair along with a long head of the biceps tenodesis. We will likely use the Arthrex four-tip SwiveLock, but we will see as we get closer to the surgical date. Dr. Yessi Vazquez issued 30 Norco pills along with five Keflex pills and her sling today for postoperative use. He will see her again about ten days postop. She understands all the risks including infection, pain, and bleeding, and wants to proceed. We have discussed the postoperative rehab today as well. 911 or go to the nearest Emergency Room

## 2021-06-30 DIAGNOSIS — R92.2 INCONCLUSIVE MAMMOGRAM: ICD-10-CM

## 2021-06-30 DIAGNOSIS — Z12.39 ENCOUNTER FOR OTHER SCREENING FOR MALIGNANT NEOPLASM OF BREAST: ICD-10-CM

## 2021-07-01 PROBLEM — M75.00 ADHESIVE CAPSULITIS OF UNSPECIFIED SHOULDER: Chronic | Status: ACTIVE | Noted: 2019-07-08

## 2021-07-01 PROBLEM — I08.0 RHEUMATIC DISORDERS OF BOTH MITRAL AND AORTIC VALVES: Chronic | Status: ACTIVE | Noted: 2019-07-08

## 2021-08-05 ENCOUNTER — APPOINTMENT (OUTPATIENT)
Dept: OBGYN | Facility: CLINIC | Age: 51
End: 2021-08-05

## 2021-08-10 ENCOUNTER — APPOINTMENT (OUTPATIENT)
Dept: CARDIOLOGY | Facility: CLINIC | Age: 51
End: 2021-08-10

## 2021-12-16 NOTE — H&P PST ADULT - PSYCHIATRIC DETAILS
Future Appointments    1/3/2022 MD RENETTA Wright AT [facility] [place] Appt Reason: Routine Existing Condition Follow Up   V.V. [phone] FOLLOW UP VISIT, NEEDS HELP WITH ADVANCE DIRECTIVES.  PT SCREENED GREEN   Booking Code: [id number]   2/2/2022 MD RENETTA Wright AT [facility] [place] 3 Month follow up       Recent Visits    11/30/2021 Acute nonintractable headache, unspecified headache type   [facility] AT [facility], APRN - CNP           11/02/2021 Onychomycosis   [facility] AT [facility] and MICHAEL Bella MD
normal affect/normal behavior

## 2022-05-26 ENCOUNTER — APPOINTMENT (OUTPATIENT)
Dept: OBGYN | Facility: CLINIC | Age: 52
End: 2022-05-26
Payer: COMMERCIAL

## 2022-05-26 VITALS
WEIGHT: 195 LBS | DIASTOLIC BLOOD PRESSURE: 86 MMHG | BODY MASS INDEX: 28.88 KG/M2 | HEIGHT: 69 IN | SYSTOLIC BLOOD PRESSURE: 130 MMHG

## 2022-05-26 DIAGNOSIS — Z01.419 ENCOUNTER FOR GYNECOLOGICAL EXAMINATION (GENERAL) (ROUTINE) W/OUT ABNORMAL FINDINGS: ICD-10-CM

## 2022-05-26 DIAGNOSIS — N76.0 ACUTE VAGINITIS: ICD-10-CM

## 2022-05-26 PROCEDURE — 99396 PREV VISIT EST AGE 40-64: CPT

## 2022-05-26 PROCEDURE — 82270 OCCULT BLOOD FECES: CPT

## 2022-05-26 RX ORDER — TERCONAZOLE 8 MG/G
0.8 CREAM VAGINAL DAILY
Qty: 1 | Refills: 0 | Status: ACTIVE | COMMUNITY
Start: 2022-05-26 | End: 1900-01-01

## 2022-05-28 LAB
CANDIDA VAG CYTO: NOT DETECTED
G VAGINALIS+PREV SP MTYP VAG QL MICRO: NOT DETECTED
HPV HIGH+LOW RISK DNA PNL CVX: NOT DETECTED
T VAGINALIS VAG QL WET PREP: NOT DETECTED

## 2022-06-01 LAB — CYTOLOGY CVX/VAG DOC THIN PREP: ABNORMAL

## 2022-06-27 ENCOUNTER — APPOINTMENT (OUTPATIENT)
Dept: OBGYN | Facility: CLINIC | Age: 52
End: 2022-06-27

## 2023-04-13 NOTE — ASU PATIENT PROFILE, ADULT - SURGICAL SITE INCISION
MD Notification    Notified Person: MD    Notified Person Name: Radha Burr    Notification Date/Time: 4/7/23 1800    Notification Interaction: [employer] 
NP Notification    Notified Person: NP    Notified Person Name: Nisa Briggs    Notification Date/Time: 4/7/23 1802    Notification Interaction: [employer]    Purpose of Notification: ODETTE bulb tube has dislodged from pt, scant discharge, no active bleeding.    Orders Received: MD at bedside, removed ODETTE bulb, placed gauze at site.     Comments:    
Paged MD to clarify orthotic order. Need to know if they want pt to have cam boot or AFO. Orthotics was here 4/12 and needs clarification.  Also left Sticky note on chart. Awaiting call back  
This nurse called orthotics,no answer,  left message that pt needs left leg orthotics.   
no

## 2024-06-18 ENCOUNTER — APPOINTMENT (OUTPATIENT)
Dept: OBGYN | Facility: CLINIC | Age: 54
End: 2024-06-18

## 2025-04-30 NOTE — H&P PST ADULT - BSA (M2)
decreased tyrese/increased time in double stance/decreased step length/decreased weight-shifting ability 2.01

## 2025-05-01 ENCOUNTER — APPOINTMENT (OUTPATIENT)
Dept: ORTHOPEDIC SURGERY | Facility: CLINIC | Age: 55
End: 2025-05-01
Payer: COMMERCIAL

## 2025-05-01 DIAGNOSIS — E78.00 PURE HYPERCHOLESTEROLEMIA, UNSPECIFIED: ICD-10-CM

## 2025-05-01 DIAGNOSIS — Z00.00 ENCOUNTER FOR GENERAL ADULT MEDICAL EXAMINATION W/OUT ABNORMAL FINDINGS: ICD-10-CM

## 2025-05-01 PROCEDURE — 73630 X-RAY EXAM OF FOOT: CPT | Mod: RT

## 2025-05-01 PROCEDURE — E0114: CPT | Mod: NU

## 2025-05-01 PROCEDURE — L4361: CPT | Mod: RT

## 2025-05-01 PROCEDURE — 73600 X-RAY EXAM OF ANKLE: CPT | Mod: RT

## 2025-05-01 PROCEDURE — 99203 OFFICE O/P NEW LOW 30 MIN: CPT | Mod: 25

## 2025-05-02 ENCOUNTER — TRANSCRIPTION ENCOUNTER (OUTPATIENT)
Age: 55
End: 2025-05-02

## 2025-05-05 ENCOUNTER — APPOINTMENT (OUTPATIENT)
Dept: ORTHOPEDIC SURGERY | Facility: CLINIC | Age: 55
End: 2025-05-05

## 2025-05-05 VITALS — BODY MASS INDEX: 28.88 KG/M2 | HEIGHT: 69 IN | WEIGHT: 195 LBS

## 2025-05-05 DIAGNOSIS — S92.251A DISPLACED FRACTURE OF NAVICULAR [SCAPHOID] OF RIGHT FOOT, INITIAL ENCOUNTER FOR CLOSED FRACTURE: ICD-10-CM

## 2025-05-05 PROCEDURE — 99203 OFFICE O/P NEW LOW 30 MIN: CPT

## 2025-05-19 ENCOUNTER — APPOINTMENT (OUTPATIENT)
Dept: ORTHOPEDIC SURGERY | Facility: CLINIC | Age: 55
End: 2025-05-19

## 2025-05-22 ENCOUNTER — APPOINTMENT (OUTPATIENT)
Dept: OBGYN | Facility: CLINIC | Age: 55
End: 2025-05-22
Payer: COMMERCIAL

## 2025-05-22 ENCOUNTER — APPOINTMENT (OUTPATIENT)
Dept: OBGYN | Facility: CLINIC | Age: 55
End: 2025-05-22

## 2025-05-22 ENCOUNTER — NON-APPOINTMENT (OUTPATIENT)
Age: 55
End: 2025-05-22

## 2025-05-22 VITALS
WEIGHT: 164 LBS | HEIGHT: 69 IN | BODY MASS INDEX: 24.29 KG/M2 | SYSTOLIC BLOOD PRESSURE: 116 MMHG | DIASTOLIC BLOOD PRESSURE: 75 MMHG

## 2025-05-22 DIAGNOSIS — E78.5 HYPERLIPIDEMIA, UNSPECIFIED: ICD-10-CM

## 2025-05-22 DIAGNOSIS — Z01.419 ENCOUNTER FOR GYNECOLOGICAL EXAMINATION (GENERAL) (ROUTINE) W/OUT ABNORMAL FINDINGS: ICD-10-CM

## 2025-05-22 DIAGNOSIS — R92.30 DENSE BREASTS, UNSPECIFIED: ICD-10-CM

## 2025-05-22 DIAGNOSIS — Z13.820 ENCOUNTER FOR SCREENING FOR OSTEOPOROSIS: ICD-10-CM

## 2025-05-22 DIAGNOSIS — Z78.0 ASYMPTOMATIC MENOPAUSAL STATE: ICD-10-CM

## 2025-05-22 DIAGNOSIS — Z12.39 ENCOUNTER FOR OTHER SCREENING FOR MALIGNANT NEOPLASM OF BREAST: ICD-10-CM

## 2025-05-22 LAB
24R-OH-CALCIDIOL SERPL-MCNC: 49.3 PG/ML
ALBUMIN SERPL ELPH-MCNC: 4.8 G/DL
ALP BLD-CCNC: 49 U/L
ALT SERPL-CCNC: 38 U/L
ANION GAP SERPL CALC-SCNC: 13 MMOL/L
AST SERPL-CCNC: 28 U/L
BILIRUB SERPL-MCNC: 0.4 MG/DL
BUN SERPL-MCNC: 15 MG/DL
CALCIUM SERPL-MCNC: 9.5 MG/DL
CHLORIDE SERPL-SCNC: 103 MMOL/L
CHOLEST SERPL-MCNC: 133 MG/DL
CO2 SERPL-SCNC: 24 MMOL/L
CREAT SERPL-MCNC: 0.82 MG/DL
EGFRCR SERPLBLD CKD-EPI 2021: 84 ML/MIN/1.73M2
ESTIMATED AVERAGE GLUCOSE: 97 MG/DL
GLUCOSE SERPL-MCNC: 78 MG/DL
HBA1C MFR BLD HPLC: 5 %
HCT VFR BLD CALC: 35 %
HDLC SERPL-MCNC: 62 MG/DL
HGB BLD-MCNC: 11.6 G/DL
LDLC SERPL-MCNC: 57 MG/DL
MCHC RBC-ENTMCNC: 31.7 PG
MCHC RBC-ENTMCNC: 33.1 G/DL
MCV RBC AUTO: 95.6 FL
NONHDLC SERPL-MCNC: 71 MG/DL
PLATELET # BLD AUTO: 194 K/UL
POTASSIUM SERPL-SCNC: 4.7 MMOL/L
PROT SERPL-MCNC: 7.1 G/DL
RBC # BLD: 3.66 M/UL
RBC # FLD: 12.8 %
SODIUM SERPL-SCNC: 140 MMOL/L
T4 FREE SERPL-MCNC: 1.1 NG/DL
TRIGL SERPL-MCNC: 68 MG/DL
TSH SERPL-ACNC: 1.75 UIU/ML
WBC # FLD AUTO: 4 K/UL

## 2025-05-22 PROCEDURE — 77080 DXA BONE DENSITY AXIAL: CPT

## 2025-05-22 PROCEDURE — 99459 PELVIC EXAMINATION: CPT | Mod: NC

## 2025-05-22 PROCEDURE — 99386 PREV VISIT NEW AGE 40-64: CPT

## 2025-05-23 LAB — HPV HIGH+LOW RISK DNA PNL CVX: NOT DETECTED

## 2025-05-28 LAB — CYTOLOGY CVX/VAG DOC THIN PREP: NORMAL
